# Patient Record
Sex: MALE | Race: WHITE
[De-identification: names, ages, dates, MRNs, and addresses within clinical notes are randomized per-mention and may not be internally consistent; named-entity substitution may affect disease eponyms.]

---

## 2018-11-05 ENCOUNTER — HOSPITAL ENCOUNTER (EMERGENCY)
Dept: HOSPITAL 25 - UCCORT | Age: 53
Discharge: HOME | End: 2018-11-05
Payer: COMMERCIAL

## 2018-11-05 VITALS — SYSTOLIC BLOOD PRESSURE: 159 MMHG | DIASTOLIC BLOOD PRESSURE: 100 MMHG

## 2018-11-05 DIAGNOSIS — Z87.891: ICD-10-CM

## 2018-11-05 DIAGNOSIS — Z79.899: ICD-10-CM

## 2018-11-05 DIAGNOSIS — K08.89: Primary | ICD-10-CM

## 2018-11-05 DIAGNOSIS — Z79.82: ICD-10-CM

## 2018-11-05 DIAGNOSIS — K04.7: ICD-10-CM

## 2018-11-05 DIAGNOSIS — E78.00: ICD-10-CM

## 2018-11-05 DIAGNOSIS — I10: ICD-10-CM

## 2018-11-05 PROCEDURE — 99202 OFFICE O/P NEW SF 15 MIN: CPT

## 2018-11-05 PROCEDURE — G0463 HOSPITAL OUTPT CLINIC VISIT: HCPCS

## 2018-11-05 NOTE — UC
Dental HPI





- HPI Summary


HPI Summary: 





52 y/o male presents to the urgent care c/o RT lower jaw pain for the past 

month. Pt reports He saw his dentist at [facility] at the end of 9/2018. He 

had some procedure done and Rx Antibiotics. Symptoms resolved for awhile and 

then on 10/25/2015 pain returned. Pain is intermittent and sometimes 

exacerbated by chewing. Pain now is 3/10, but it can get severe 8/10 like this 

afternoon. He has been taking Tylenol PO sicne he can't take Advil due to his 

Polycystic Kidney disease. Pt states he needs something stronger until he he 

see his Dentist. He called this morning and an appt was schedule for 11/12/2018 

for molar extraction.  Pt states mild swelling over the same molar. Pt denies 

fever, trismus. SOB, chest pain, abdominal pain, N/V/D, dizziness. 








- History of Current Complaint


Chief Complaint: UCDentalProblem


Stated Complaint: DENTAL PAIN


Time Seen by Provider: 11/05/18 20:30


Hx Obtained From: Patient


Onset/Duration: Gradual Onset, Lasting Weeks - 1 month, Still Present, Worse 

Since - 3 days


Severity: Mild


Pain Intensity: 3


Pain Scale Used: 0-10 Numeric


Aggravating Factor(s): Chewing


Alleviating Factor(s): OTC Meds


Related History: Previous Dental Care on Same Tooth - 9/2018





- Allergies/Home Medications


Allergies/Adverse Reactions: 


 Allergies











Allergy/AdvReac Type Severity Reaction Status Date / Time


 


No Known Allergies Allergy   Verified 11/05/18 20:03











Home Medications: 


 Home Medications





Aspirin TAB* [Aspirin 325 MG TAB*] 325 mg PO DAILY 11/05/18 [History Confirmed 

11/05/18]


Losartan TAB* [Cozaar TAB*] 100 mg PO DAILY 11/05/18 [History Confirmed 11/05/18

]


Multivitamin [Multivitamins] 1 each PO DAILY 11/05/18 [History Confirmed 11/05/ 18]


Omega-3 Fatty Acids/Fish Oil [Omega 3 1,000 mg Softgel] 1 each PO DAILY 11/05/ 18 [History Confirmed 11/05/18]


Omeprazole CAP* [Prilosec CAP* 20 MG] 40 mg PO DAILY 11/05/18 [History 

Confirmed 11/05/18]


Rosuvastatin Calcium 10 mg PO DAILY 11/05/18 [History Confirmed 11/05/18]


Ubidecarenone [Coq-10] 100 mg PO DAILY 11/05/18 [History Confirmed 11/05/18]











PMH/Surg Hx/FS Hx/Imm Hx


Previously Healthy: Yes


Endocrine History: Dyslipidemia


Cardiovascular History: Cardiac Disease, Hypertension


Other Cardiovascular History: triple bypass


Other GI/ History: PKD





- Surgical History


Surgical History: Yes





- Family History


Known Family History: Positive: Cardiac Disease, Hypertension, Diabetes





- Social History


Occupation: Employed Full-time


Lives: With Family


Alcohol Use: None


Substance Use Type: None


Smoking Status (MU): Former Smoker





Review of Systems


Constitutional: Negative


Skin: Negative


Eyes: Negative


ENT: Dental Pain - RT lower jaw dental pain


Respiratory: Negative


Cardiovascular: Negative


Gastrointestinal: Negative


Genitourinary: Negative


Motor: Negative


Neurovascular: Negative


Musculoskeletal: Negative


Neurological: Negative


Psychological: Negative


Is Patient Immunocompromised?: No


All Other Systems Reviewed And Are Negative: Yes





Physical Exam





- Summary


Physical Exam Summary: 





Vital Signs Reviewed: Yes


General: Well-Appearing, Well-Nourished male sitting in the examining table w/o 

any respiratory or pain distress


Eyes: Positive: Conjunctiva Clear - PERRLA, EOMI,


ENT: Positive: Normal ENT inspection, Hearing grossly normal, Pharynx normal, 

TMs normal - B/L external ear canals clear,. Negative: Tonsillar swelling, 

Tonsillar exudate, Trismus


Dental: Positive: Positive , Abscess @ - mild gingival swelling and erythema, 

tender to percussion. involves tissue surrounding the molar  #32, Cervical 

Lymphadenopathy - anterior-


Neck: Positive: Supple


Respiratory: Positive: Chest non-tender, Lungs clear, Normal breath sounds, No 

respiratory distress


Cardiovascular: Positive: RRR, No Murmur, Pulses Normal, Brisk Capillary Refill


Abdomen Description: Positive: Nontender, No Organomegaly, Soft. Negative: CVA 

Tenderness (R), CVA Tenderness (L)


Bowel Sounds: Positive: Present


Musculoskeletal: Positive: Strength Intact, ROM Intact, No Edema


Neurological Exam: Normal


Psychological Exam: Normal


Skin Exam: Normal








Triage Information Reviewed: Yes


Vital Signs: 


 Initial Vital Signs











Temp  97.4 F   11/05/18 19:52


 


Pulse  47   11/05/18 19:52


 


Resp  16   11/05/18 19:52


 


BP  159/100   11/05/18 19:52


 


Pulse Ox  100   11/05/18 19:52














Dental Complaint Course/Dx





- Course


Course Of Treatment: 52 y/o male presents to the urgent care c/o RT lower jaw 

pain for the past month. Pt reports He saw his dentist at [facility] at the 

end of 9/2018. He had some procedure done and Rx Antibiotics. Symptoms resolved 

for awhile and then on 10/25/2015 pain returned. Pain is intermittent and 

sometimes exacerbated by chewing. Pain now is 3/10, but it can get severe 8/10 

like this afternoon. He has been taking Tylenol PO sicne he can't take Advil 

due to his Polycystic Kidney disease. Pt states he needs something stronger 

until he he see his Dentist. He called this morning and an appt was schedule 

for 11/12/2018 for molar extraction.  Pt states mild swelling over the same 

molar. Pt denies fever, trismus. SOB, chest pain, abdominal pain, N/V/D, 

dizziness. Hx obtained.  Pt with dental abscess around molar #33 on 

examination. Pt given first dose of Clindamycin PO and  viscous Lidocaine at 

the clinic to alleviate symptoms.  Pt Rx Clindamycin PO  and Norcon PO for 

pain. I-stop done: nothing reported. Pt w/ Hx pf PKD and  has been taken Tyleno 

PO w/o any improvement. Pt strongly advised to f/u with his  Dentist as soon as 

possible further evaluation and treatment. Pt's BP is elevated today advised to 

decrease salt in diet, monitor BP and f/u with PCP for further management. Pt 

understood and agreed with plan of care. Left the clinic ambulating.





- Differential Dx/Diagnosis


Differential Diagnosis/Dx: Dental Abscess, Dental Caries, Gingivitis, 

Odontogenic Pain, Peridontic Disease, Peritonsillar Abcess


Provider Diagnoses: 1- RT lower jaw dental pain.  2- Dental abscess around 

molar #32.  3- Uncontrolled HTN





Discharge





- Sign-Out/Discharge


Documenting (check all that apply): Patient Departure - D/C home


All imaging exams completed and their final reports reviewed: No Studies





- Discharge Plan


Condition: Stable


Disposition: HOME


Prescriptions: 


Clindamycin Cap(NF) [Clindamycin Cap 300 mg Cap(NF)] 300 mg PO TID #29 cap


HYDROcodone/ACETAMIN 5-325 MG* [Norco 5-325 TAB*] 1 tab PO Q6H PRN #12 tab MDD 

1g/4h-4g/day


 PRN Reason: dental pain


Lidocaine 2% VISCOUS* [Xylocaine 2% Viscous*] 15 ml SWISH SPIT Q4H PRN #1 btl


 PRN Reason: dental pain


Patient Education Materials:  Dental Abscess (ED), Low-Sodium Diet (ED)


Referrals: 


Aurelia SALAZAR,Bridger FIGUEROA [Primary Care Provider] - 2 Days


Additional Instructions: 


1-Please take full course of antibiotic to avoid resistance. /first dose given 

tonight. Take viscous Lidocaine as directed to alleviate pain


2- Take Norco PO as instructed  after meals to alleviate pain and swelling. If 

it makes you drowsy please do not drive and take it only at night time


3- F/u with your Dentist  as soon as possible for further treatment.


4-Your BP is elevated today. please decrease salt in your diet, monitor BP and 

if it continues to be elevated please f/u with your PCP for further management























- Billing Disposition and Condition


Condition: STABLE


Disposition: Home